# Patient Record
Sex: MALE | Race: WHITE
[De-identification: names, ages, dates, MRNs, and addresses within clinical notes are randomized per-mention and may not be internally consistent; named-entity substitution may affect disease eponyms.]

---

## 2020-10-31 ENCOUNTER — HOSPITAL ENCOUNTER (EMERGENCY)
Dept: HOSPITAL 26 - MED | Age: 20
Discharge: HOME | End: 2020-10-31
Payer: COMMERCIAL

## 2020-10-31 VITALS — DIASTOLIC BLOOD PRESSURE: 66 MMHG | SYSTOLIC BLOOD PRESSURE: 123 MMHG

## 2020-10-31 VITALS — WEIGHT: 157.25 LBS | BODY MASS INDEX: 23.83 KG/M2 | HEIGHT: 68 IN
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
## 2020-10-31 DIAGNOSIS — S90.851A: Primary | ICD-10-CM
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
## 2020-10-31 NOTE — NUR
20/M walk in pt, ambulatory from triage to bed 07 with steady gait

c/o small shard of glass entering right heel approx 4 days ago after knocking 
over wine bottle and stepping on the glass. Small puncture wound seen at the 
right heel, no bleeding, no glass visualized.  

PMH: elliott KATHLEEN

## 2020-10-31 NOTE — NUR
Patient discharged with v/s stable. Written and verbal after care instructions 
given and explained. 

Patient alert, oriented and verbalized understanding of instructions. 
Ambulatory with steady gait. All questions addressed prior to discharge. ID 
band removed. Patient advised to follow up with PMD. Rx of Motrin given. 
Patient educated on indication of medication including possible reaction and 
side effects. Opportunity to ask questions provided and answered.